# Patient Record
Sex: FEMALE | Race: ASIAN
[De-identification: names, ages, dates, MRNs, and addresses within clinical notes are randomized per-mention and may not be internally consistent; named-entity substitution may affect disease eponyms.]

---

## 2019-03-09 ENCOUNTER — HOSPITAL ENCOUNTER (INPATIENT)
Dept: HOSPITAL 19 - LDRO | Age: 34
LOS: 28 days | Discharge: HOME | End: 2019-04-06
Payer: COMMERCIAL

## 2019-03-09 VITALS — HEIGHT: 62.01 IN | WEIGHT: 125.22 LBS | BODY MASS INDEX: 22.75 KG/M2

## 2019-03-09 DIAGNOSIS — O41.1230: ICD-10-CM

## 2019-03-09 DIAGNOSIS — E03.9: ICD-10-CM

## 2019-03-09 DIAGNOSIS — Z3A.39: ICD-10-CM

## 2019-03-09 DIAGNOSIS — E28.2: ICD-10-CM

## 2019-04-03 VITALS — SYSTOLIC BLOOD PRESSURE: 96 MMHG | DIASTOLIC BLOOD PRESSURE: 60 MMHG | TEMPERATURE: 98 F | HEART RATE: 76 BPM

## 2019-04-03 VITALS — HEART RATE: 106 BPM | SYSTOLIC BLOOD PRESSURE: 112 MMHG | TEMPERATURE: 98.1 F | DIASTOLIC BLOOD PRESSURE: 75 MMHG

## 2019-04-03 LAB
BASOPHILS # BLD: 0.1 10*3/UL (ref 0–0.2)
BASOPHILS NFR BLD AUTO: 0.8 % (ref 0–2)
EOSINOPHIL # BLD: 0.2 10*3/UL (ref 0–0.7)
EOSINOPHIL NFR BLD: 2.9 % (ref 0–4)
ERYTHROCYTE [DISTWIDTH] IN BLOOD BY AUTOMATED COUNT: 13.3 % (ref 11.5–14.5)
GRANULOCYTES # BLD AUTO: 52.1 % (ref 42.2–75.2)
HCT VFR BLD AUTO: 34.5 % (ref 37–47)
HGB BLD-MCNC: 11.4 G/DL (ref 12.5–16)
LYMPHOCYTES # BLD: 2.3 10*3/UL (ref 1.2–3.4)
LYMPHOCYTES NFR BLD: 31.8 % (ref 20–51)
MCH RBC QN AUTO: 26 PG (ref 27–31)
MCHC RBC AUTO-ENTMCNC: 33 G/DL (ref 33–37)
MCV RBC AUTO: 80 FL (ref 80–100)
MONOCYTES # BLD: 0.9 10*3/UL (ref 0.1–0.6)
MONOCYTES NFR BLD AUTO: 11.7 % (ref 1.7–9.3)
NEUTROPHILS # BLD: 3.8 10*3/UL (ref 1.4–6.5)
PLATELET # BLD AUTO: 362 K/MM3 (ref 130–400)
PMV BLD AUTO: 10.3 FL (ref 7.4–10.4)
RBC # BLD AUTO: 4.34 M/MM3 (ref 4.1–5.3)

## 2019-04-03 PROCEDURE — 3E0P7VZ INTRODUCTION OF HORMONE INTO FEMALE REPRODUCTIVE, VIA NATURAL OR ARTIFICIAL OPENING: ICD-10-PCS

## 2019-04-03 NOTE — NUR
G1L0. 39-1. Ambulatory to [facility] 6 with spouse for scheduled cytotec induction.
Clean gown on. EFM and TOCO explained and applied. Pt denies contractions, LOF
or vaginal bleeding. Reports good fetal movement. Vital signs, assessment and
consents completed. Call light within reach.
1910: Reactive FHR strip. Monitors off so pt can ambulate freely.
1912: Dr.Goodpasture called and updated on status on floor. See physican
notification. Pt updated on plan of care and questions answered. Pt verbalizes
understanding.
2259: Monitors reapplied and plan of care explained.
2227: Reactive FHR strip. Cytotec 25mcg explained and administered at this
time. Plan of care explained to pt. Vistaril also administered at this time.
Pt denies needing anything else at this time.

## 2019-04-04 VITALS — TEMPERATURE: 98.4 F | SYSTOLIC BLOOD PRESSURE: 107 MMHG | DIASTOLIC BLOOD PRESSURE: 60 MMHG | HEART RATE: 109 BPM

## 2019-04-04 VITALS — SYSTOLIC BLOOD PRESSURE: 116 MMHG | DIASTOLIC BLOOD PRESSURE: 70 MMHG | TEMPERATURE: 98.3 F | HEART RATE: 93 BPM

## 2019-04-04 VITALS — DIASTOLIC BLOOD PRESSURE: 65 MMHG | HEART RATE: 86 BPM | SYSTOLIC BLOOD PRESSURE: 103 MMHG

## 2019-04-04 VITALS — TEMPERATURE: 98 F | DIASTOLIC BLOOD PRESSURE: 73 MMHG | HEART RATE: 84 BPM | SYSTOLIC BLOOD PRESSURE: 108 MMHG

## 2019-04-04 VITALS — DIASTOLIC BLOOD PRESSURE: 67 MMHG | HEART RATE: 95 BPM | SYSTOLIC BLOOD PRESSURE: 108 MMHG

## 2019-04-04 VITALS — SYSTOLIC BLOOD PRESSURE: 113 MMHG | HEART RATE: 96 BPM | DIASTOLIC BLOOD PRESSURE: 68 MMHG

## 2019-04-04 VITALS — HEART RATE: 88 BPM | DIASTOLIC BLOOD PRESSURE: 58 MMHG | SYSTOLIC BLOOD PRESSURE: 100 MMHG

## 2019-04-04 VITALS — DIASTOLIC BLOOD PRESSURE: 56 MMHG | SYSTOLIC BLOOD PRESSURE: 103 MMHG | HEART RATE: 83 BPM

## 2019-04-04 VITALS — HEART RATE: 101 BPM | SYSTOLIC BLOOD PRESSURE: 101 MMHG | DIASTOLIC BLOOD PRESSURE: 60 MMHG

## 2019-04-04 VITALS — DIASTOLIC BLOOD PRESSURE: 52 MMHG | SYSTOLIC BLOOD PRESSURE: 88 MMHG | HEART RATE: 87 BPM

## 2019-04-04 VITALS — TEMPERATURE: 97.6 F | HEART RATE: 90 BPM | DIASTOLIC BLOOD PRESSURE: 63 MMHG | SYSTOLIC BLOOD PRESSURE: 110 MMHG

## 2019-04-04 VITALS — HEART RATE: 93 BPM | SYSTOLIC BLOOD PRESSURE: 102 MMHG | DIASTOLIC BLOOD PRESSURE: 73 MMHG

## 2019-04-04 VITALS — HEART RATE: 86 BPM | DIASTOLIC BLOOD PRESSURE: 56 MMHG | SYSTOLIC BLOOD PRESSURE: 109 MMHG

## 2019-04-04 VITALS — HEART RATE: 71 BPM | DIASTOLIC BLOOD PRESSURE: 50 MMHG | SYSTOLIC BLOOD PRESSURE: 91 MMHG

## 2019-04-04 VITALS — HEART RATE: 81 BPM | SYSTOLIC BLOOD PRESSURE: 92 MMHG | DIASTOLIC BLOOD PRESSURE: 55 MMHG

## 2019-04-04 VITALS — HEART RATE: 96 BPM | DIASTOLIC BLOOD PRESSURE: 61 MMHG | SYSTOLIC BLOOD PRESSURE: 109 MMHG

## 2019-04-04 VITALS — SYSTOLIC BLOOD PRESSURE: 95 MMHG | HEART RATE: 102 BPM | DIASTOLIC BLOOD PRESSURE: 53 MMHG

## 2019-04-04 VITALS — HEART RATE: 89 BPM | DIASTOLIC BLOOD PRESSURE: 69 MMHG | SYSTOLIC BLOOD PRESSURE: 108 MMHG

## 2019-04-04 VITALS — DIASTOLIC BLOOD PRESSURE: 53 MMHG | SYSTOLIC BLOOD PRESSURE: 98 MMHG | HEART RATE: 75 BPM

## 2019-04-04 VITALS — DIASTOLIC BLOOD PRESSURE: 65 MMHG | SYSTOLIC BLOOD PRESSURE: 120 MMHG | HEART RATE: 87 BPM

## 2019-04-04 VITALS — HEART RATE: 79 BPM | DIASTOLIC BLOOD PRESSURE: 50 MMHG | SYSTOLIC BLOOD PRESSURE: 91 MMHG

## 2019-04-04 VITALS — DIASTOLIC BLOOD PRESSURE: 57 MMHG | SYSTOLIC BLOOD PRESSURE: 97 MMHG | HEART RATE: 71 BPM

## 2019-04-04 VITALS — DIASTOLIC BLOOD PRESSURE: 59 MMHG | SYSTOLIC BLOOD PRESSURE: 100 MMHG | HEART RATE: 78 BPM

## 2019-04-04 VITALS — HEART RATE: 90 BPM | DIASTOLIC BLOOD PRESSURE: 77 MMHG | SYSTOLIC BLOOD PRESSURE: 144 MMHG

## 2019-04-04 VITALS — DIASTOLIC BLOOD PRESSURE: 76 MMHG | SYSTOLIC BLOOD PRESSURE: 112 MMHG | HEART RATE: 90 BPM

## 2019-04-04 VITALS — DIASTOLIC BLOOD PRESSURE: 55 MMHG | SYSTOLIC BLOOD PRESSURE: 99 MMHG | HEART RATE: 69 BPM

## 2019-04-04 VITALS — HEART RATE: 98 BPM | DIASTOLIC BLOOD PRESSURE: 73 MMHG | SYSTOLIC BLOOD PRESSURE: 113 MMHG

## 2019-04-04 VITALS — DIASTOLIC BLOOD PRESSURE: 70 MMHG | SYSTOLIC BLOOD PRESSURE: 119 MMHG | HEART RATE: 85 BPM

## 2019-04-04 VITALS — SYSTOLIC BLOOD PRESSURE: 113 MMHG | DIASTOLIC BLOOD PRESSURE: 71 MMHG | HEART RATE: 87 BPM

## 2019-04-04 VITALS — DIASTOLIC BLOOD PRESSURE: 69 MMHG | SYSTOLIC BLOOD PRESSURE: 109 MMHG | HEART RATE: 84 BPM

## 2019-04-04 VITALS — HEART RATE: 86 BPM | DIASTOLIC BLOOD PRESSURE: 55 MMHG | SYSTOLIC BLOOD PRESSURE: 104 MMHG

## 2019-04-04 VITALS — DIASTOLIC BLOOD PRESSURE: 53 MMHG | SYSTOLIC BLOOD PRESSURE: 94 MMHG | HEART RATE: 81 BPM

## 2019-04-04 VITALS — SYSTOLIC BLOOD PRESSURE: 111 MMHG | DIASTOLIC BLOOD PRESSURE: 68 MMHG | HEART RATE: 102 BPM

## 2019-04-04 VITALS — DIASTOLIC BLOOD PRESSURE: 65 MMHG | HEART RATE: 82 BPM | SYSTOLIC BLOOD PRESSURE: 114 MMHG

## 2019-04-04 VITALS — HEART RATE: 86 BPM | DIASTOLIC BLOOD PRESSURE: 52 MMHG | SYSTOLIC BLOOD PRESSURE: 88 MMHG

## 2019-04-04 VITALS — HEART RATE: 83 BPM | SYSTOLIC BLOOD PRESSURE: 94 MMHG | DIASTOLIC BLOOD PRESSURE: 61 MMHG

## 2019-04-04 VITALS — HEART RATE: 88 BPM | SYSTOLIC BLOOD PRESSURE: 106 MMHG | DIASTOLIC BLOOD PRESSURE: 72 MMHG

## 2019-04-04 VITALS — DIASTOLIC BLOOD PRESSURE: 64 MMHG | HEART RATE: 72 BPM | SYSTOLIC BLOOD PRESSURE: 104 MMHG

## 2019-04-04 VITALS — DIASTOLIC BLOOD PRESSURE: 54 MMHG | SYSTOLIC BLOOD PRESSURE: 93 MMHG | HEART RATE: 121 BPM

## 2019-04-04 VITALS — DIASTOLIC BLOOD PRESSURE: 59 MMHG | HEART RATE: 118 BPM | SYSTOLIC BLOOD PRESSURE: 98 MMHG

## 2019-04-04 VITALS — DIASTOLIC BLOOD PRESSURE: 55 MMHG | HEART RATE: 86 BPM | SYSTOLIC BLOOD PRESSURE: 93 MMHG

## 2019-04-04 VITALS — HEART RATE: 80 BPM | SYSTOLIC BLOOD PRESSURE: 109 MMHG | DIASTOLIC BLOOD PRESSURE: 68 MMHG

## 2019-04-04 VITALS — SYSTOLIC BLOOD PRESSURE: 118 MMHG | DIASTOLIC BLOOD PRESSURE: 71 MMHG | HEART RATE: 91 BPM

## 2019-04-04 VITALS — HEART RATE: 78 BPM | SYSTOLIC BLOOD PRESSURE: 100 MMHG | DIASTOLIC BLOOD PRESSURE: 54 MMHG

## 2019-04-04 VITALS — HEART RATE: 104 BPM | SYSTOLIC BLOOD PRESSURE: 125 MMHG | DIASTOLIC BLOOD PRESSURE: 77 MMHG

## 2019-04-04 VITALS — HEART RATE: 90 BPM | SYSTOLIC BLOOD PRESSURE: 102 MMHG | DIASTOLIC BLOOD PRESSURE: 69 MMHG

## 2019-04-04 VITALS — HEART RATE: 96 BPM | DIASTOLIC BLOOD PRESSURE: 61 MMHG | SYSTOLIC BLOOD PRESSURE: 107 MMHG

## 2019-04-04 VITALS — SYSTOLIC BLOOD PRESSURE: 120 MMHG | DIASTOLIC BLOOD PRESSURE: 72 MMHG | HEART RATE: 97 BPM

## 2019-04-04 VITALS — DIASTOLIC BLOOD PRESSURE: 71 MMHG | HEART RATE: 80 BPM | SYSTOLIC BLOOD PRESSURE: 117 MMHG

## 2019-04-04 VITALS — DIASTOLIC BLOOD PRESSURE: 60 MMHG | SYSTOLIC BLOOD PRESSURE: 109 MMHG | TEMPERATURE: 97.5 F | HEART RATE: 87 BPM

## 2019-04-04 VITALS — SYSTOLIC BLOOD PRESSURE: 116 MMHG | HEART RATE: 93 BPM | DIASTOLIC BLOOD PRESSURE: 69 MMHG

## 2019-04-04 VITALS — DIASTOLIC BLOOD PRESSURE: 72 MMHG | SYSTOLIC BLOOD PRESSURE: 116 MMHG | HEART RATE: 100 BPM | TEMPERATURE: 98.4 F

## 2019-04-04 VITALS — DIASTOLIC BLOOD PRESSURE: 63 MMHG | HEART RATE: 92 BPM | SYSTOLIC BLOOD PRESSURE: 111 MMHG

## 2019-04-04 VITALS — DIASTOLIC BLOOD PRESSURE: 76 MMHG | HEART RATE: 95 BPM | SYSTOLIC BLOOD PRESSURE: 132 MMHG

## 2019-04-04 VITALS — DIASTOLIC BLOOD PRESSURE: 56 MMHG | HEART RATE: 123 BPM | SYSTOLIC BLOOD PRESSURE: 94 MMHG

## 2019-04-04 VITALS — DIASTOLIC BLOOD PRESSURE: 66 MMHG | HEART RATE: 94 BPM | SYSTOLIC BLOOD PRESSURE: 97 MMHG

## 2019-04-04 VITALS — DIASTOLIC BLOOD PRESSURE: 56 MMHG | HEART RATE: 70 BPM | SYSTOLIC BLOOD PRESSURE: 98 MMHG

## 2019-04-04 VITALS — SYSTOLIC BLOOD PRESSURE: 104 MMHG | HEART RATE: 102 BPM | DIASTOLIC BLOOD PRESSURE: 67 MMHG

## 2019-04-04 VITALS — SYSTOLIC BLOOD PRESSURE: 105 MMHG | DIASTOLIC BLOOD PRESSURE: 70 MMHG | HEART RATE: 93 BPM

## 2019-04-04 VITALS — DIASTOLIC BLOOD PRESSURE: 64 MMHG | HEART RATE: 99 BPM | SYSTOLIC BLOOD PRESSURE: 101 MMHG

## 2019-04-04 VITALS — DIASTOLIC BLOOD PRESSURE: 73 MMHG | HEART RATE: 91 BPM | SYSTOLIC BLOOD PRESSURE: 112 MMHG

## 2019-04-04 VITALS — DIASTOLIC BLOOD PRESSURE: 69 MMHG | SYSTOLIC BLOOD PRESSURE: 108 MMHG | HEART RATE: 86 BPM

## 2019-04-04 VITALS — TEMPERATURE: 98 F

## 2019-04-04 VITALS — DIASTOLIC BLOOD PRESSURE: 63 MMHG | HEART RATE: 103 BPM | SYSTOLIC BLOOD PRESSURE: 108 MMHG

## 2019-04-04 VITALS — TEMPERATURE: 97.9 F

## 2019-04-04 PROCEDURE — 3E033VJ INTRODUCTION OF OTHER HORMONE INTO PERIPHERAL VEIN, PERCUTANEOUS APPROACH: ICD-10-PCS

## 2019-04-04 NOTE — NUR
Dr.Goodpasture at nurse's station reviewing FHR strip. Agrees with this RN
with early decelerations. Orders received to increase pitocin to 32mus/hr.
Pitocin increased at this time.

## 2019-04-04 NOTE — NUR
Dr.Goodpasture at nurse's station reviewing FHR strip. See physican
notification.
2157: Pitocin turned off at this time.

## 2019-04-04 NOTE — NUR
Dr.Goodpasture at bedside and reviews FHR strip. SVE 3-4/80/-1 per provider.
Pt assisted into HF position. No new orders at this time. Continue to monitor.

## 2019-04-05 VITALS — SYSTOLIC BLOOD PRESSURE: 121 MMHG | DIASTOLIC BLOOD PRESSURE: 79 MMHG | HEART RATE: 112 BPM

## 2019-04-05 VITALS — TEMPERATURE: 99.1 F

## 2019-04-05 VITALS — SYSTOLIC BLOOD PRESSURE: 112 MMHG | DIASTOLIC BLOOD PRESSURE: 60 MMHG | HEART RATE: 97 BPM

## 2019-04-05 VITALS — HEART RATE: 136 BPM | SYSTOLIC BLOOD PRESSURE: 88 MMHG | DIASTOLIC BLOOD PRESSURE: 51 MMHG

## 2019-04-05 VITALS — HEART RATE: 126 BPM | SYSTOLIC BLOOD PRESSURE: 152 MMHG | DIASTOLIC BLOOD PRESSURE: 72 MMHG | TEMPERATURE: 102 F

## 2019-04-05 VITALS — SYSTOLIC BLOOD PRESSURE: 92 MMHG | HEART RATE: 162 BPM | DIASTOLIC BLOOD PRESSURE: 55 MMHG | TEMPERATURE: 100.2 F

## 2019-04-05 VITALS — SYSTOLIC BLOOD PRESSURE: 83 MMHG | DIASTOLIC BLOOD PRESSURE: 46 MMHG | HEART RATE: 111 BPM

## 2019-04-05 VITALS — HEART RATE: 157 BPM | DIASTOLIC BLOOD PRESSURE: 44 MMHG | SYSTOLIC BLOOD PRESSURE: 83 MMHG

## 2019-04-05 VITALS — SYSTOLIC BLOOD PRESSURE: 131 MMHG | HEART RATE: 155 BPM | DIASTOLIC BLOOD PRESSURE: 70 MMHG

## 2019-04-05 VITALS — DIASTOLIC BLOOD PRESSURE: 68 MMHG | HEART RATE: 139 BPM | SYSTOLIC BLOOD PRESSURE: 161 MMHG

## 2019-04-05 VITALS — HEART RATE: 114 BPM | TEMPERATURE: 98.1 F | SYSTOLIC BLOOD PRESSURE: 102 MMHG | DIASTOLIC BLOOD PRESSURE: 68 MMHG

## 2019-04-05 VITALS — HEART RATE: 117 BPM | SYSTOLIC BLOOD PRESSURE: 87 MMHG | DIASTOLIC BLOOD PRESSURE: 51 MMHG

## 2019-04-05 VITALS — DIASTOLIC BLOOD PRESSURE: 48 MMHG | HEART RATE: 122 BPM | SYSTOLIC BLOOD PRESSURE: 83 MMHG

## 2019-04-05 VITALS — DIASTOLIC BLOOD PRESSURE: 57 MMHG | HEART RATE: 181 BPM | SYSTOLIC BLOOD PRESSURE: 130 MMHG | TEMPERATURE: 99.7 F

## 2019-04-05 VITALS — SYSTOLIC BLOOD PRESSURE: 81 MMHG | DIASTOLIC BLOOD PRESSURE: 42 MMHG | HEART RATE: 125 BPM

## 2019-04-05 VITALS — HEART RATE: 120 BPM | SYSTOLIC BLOOD PRESSURE: 80 MMHG | DIASTOLIC BLOOD PRESSURE: 48 MMHG

## 2019-04-05 VITALS — SYSTOLIC BLOOD PRESSURE: 90 MMHG | DIASTOLIC BLOOD PRESSURE: 51 MMHG | HEART RATE: 155 BPM

## 2019-04-05 VITALS — DIASTOLIC BLOOD PRESSURE: 53 MMHG | SYSTOLIC BLOOD PRESSURE: 95 MMHG | TEMPERATURE: 98.6 F | HEART RATE: 96 BPM

## 2019-04-05 VITALS — SYSTOLIC BLOOD PRESSURE: 87 MMHG | DIASTOLIC BLOOD PRESSURE: 50 MMHG | HEART RATE: 162 BPM

## 2019-04-05 VITALS — TEMPERATURE: 97.4 F | HEART RATE: 100 BPM | SYSTOLIC BLOOD PRESSURE: 92 MMHG | DIASTOLIC BLOOD PRESSURE: 54 MMHG

## 2019-04-05 VITALS — TEMPERATURE: 99.6 F | DIASTOLIC BLOOD PRESSURE: 45 MMHG | HEART RATE: 141 BPM | SYSTOLIC BLOOD PRESSURE: 78 MMHG

## 2019-04-05 VITALS — DIASTOLIC BLOOD PRESSURE: 54 MMHG | HEART RATE: 166 BPM | SYSTOLIC BLOOD PRESSURE: 91 MMHG

## 2019-04-05 VITALS — DIASTOLIC BLOOD PRESSURE: 72 MMHG | HEART RATE: 113 BPM | SYSTOLIC BLOOD PRESSURE: 118 MMHG

## 2019-04-05 VITALS — HEART RATE: 102 BPM | SYSTOLIC BLOOD PRESSURE: 89 MMHG | DIASTOLIC BLOOD PRESSURE: 45 MMHG

## 2019-04-05 VITALS — HEART RATE: 153 BPM | SYSTOLIC BLOOD PRESSURE: 84 MMHG | DIASTOLIC BLOOD PRESSURE: 49 MMHG | TEMPERATURE: 99.4 F

## 2019-04-05 VITALS — HEART RATE: 110 BPM | SYSTOLIC BLOOD PRESSURE: 140 MMHG | DIASTOLIC BLOOD PRESSURE: 88 MMHG

## 2019-04-05 VITALS — DIASTOLIC BLOOD PRESSURE: 65 MMHG | HEART RATE: 73 BPM | SYSTOLIC BLOOD PRESSURE: 103 MMHG | TEMPERATURE: 97.3 F

## 2019-04-05 VITALS — SYSTOLIC BLOOD PRESSURE: 74 MMHG | HEART RATE: 127 BPM | DIASTOLIC BLOOD PRESSURE: 43 MMHG

## 2019-04-05 VITALS — SYSTOLIC BLOOD PRESSURE: 110 MMHG | DIASTOLIC BLOOD PRESSURE: 59 MMHG | HEART RATE: 95 BPM

## 2019-04-05 VITALS — SYSTOLIC BLOOD PRESSURE: 136 MMHG | HEART RATE: 118 BPM | DIASTOLIC BLOOD PRESSURE: 98 MMHG

## 2019-04-05 VITALS — DIASTOLIC BLOOD PRESSURE: 70 MMHG | HEART RATE: 98 BPM | SYSTOLIC BLOOD PRESSURE: 104 MMHG | TEMPERATURE: 98.2 F

## 2019-04-05 VITALS — HEART RATE: 109 BPM | SYSTOLIC BLOOD PRESSURE: 124 MMHG | TEMPERATURE: 98.9 F | DIASTOLIC BLOOD PRESSURE: 65 MMHG

## 2019-04-05 VITALS — DIASTOLIC BLOOD PRESSURE: 53 MMHG | HEART RATE: 118 BPM | TEMPERATURE: 98.5 F | SYSTOLIC BLOOD PRESSURE: 83 MMHG

## 2019-04-05 VITALS — HEART RATE: 116 BPM | SYSTOLIC BLOOD PRESSURE: 89 MMHG | DIASTOLIC BLOOD PRESSURE: 55 MMHG

## 2019-04-05 VITALS — SYSTOLIC BLOOD PRESSURE: 94 MMHG | DIASTOLIC BLOOD PRESSURE: 52 MMHG | HEART RATE: 102 BPM

## 2019-04-05 VITALS — SYSTOLIC BLOOD PRESSURE: 84 MMHG | DIASTOLIC BLOOD PRESSURE: 53 MMHG | HEART RATE: 113 BPM

## 2019-04-05 VITALS — DIASTOLIC BLOOD PRESSURE: 50 MMHG | SYSTOLIC BLOOD PRESSURE: 86 MMHG | HEART RATE: 151 BPM

## 2019-04-05 VITALS — HEART RATE: 160 BPM | SYSTOLIC BLOOD PRESSURE: 81 MMHG | DIASTOLIC BLOOD PRESSURE: 47 MMHG

## 2019-04-05 VITALS — SYSTOLIC BLOOD PRESSURE: 81 MMHG | HEART RATE: 109 BPM | DIASTOLIC BLOOD PRESSURE: 51 MMHG

## 2019-04-05 VITALS — SYSTOLIC BLOOD PRESSURE: 81 MMHG | HEART RATE: 133 BPM | DIASTOLIC BLOOD PRESSURE: 50 MMHG

## 2019-04-05 VITALS — SYSTOLIC BLOOD PRESSURE: 82 MMHG | HEART RATE: 150 BPM | DIASTOLIC BLOOD PRESSURE: 50 MMHG

## 2019-04-05 VITALS — HEART RATE: 110 BPM | SYSTOLIC BLOOD PRESSURE: 131 MMHG | DIASTOLIC BLOOD PRESSURE: 73 MMHG

## 2019-04-05 VITALS — DIASTOLIC BLOOD PRESSURE: 49 MMHG | SYSTOLIC BLOOD PRESSURE: 84 MMHG | HEART RATE: 151 BPM

## 2019-04-05 VITALS — SYSTOLIC BLOOD PRESSURE: 92 MMHG | DIASTOLIC BLOOD PRESSURE: 56 MMHG | HEART RATE: 113 BPM

## 2019-04-05 VITALS — SYSTOLIC BLOOD PRESSURE: 132 MMHG | DIASTOLIC BLOOD PRESSURE: 77 MMHG | HEART RATE: 139 BPM

## 2019-04-05 LAB
ERYTHROCYTE [DISTWIDTH] IN BLOOD BY AUTOMATED COUNT: 13.7 % (ref 11.5–14.5)
HCT VFR BLD AUTO: 22.7 % (ref 37–47)
HGB BLD-MCNC: 7.6 G/DL (ref 12.5–16)
LYMPHOCYTES NFR BLD MANUAL: 3 % (ref 20–51)
MCH RBC QN AUTO: 27 PG (ref 27–31)
MCHC RBC AUTO-ENTMCNC: 34 G/DL (ref 33–37)
MCV RBC AUTO: 80 FL (ref 80–100)
MONOCYTES NFR BLD: 7 % (ref 1.7–9.3)
NEUTS BAND NFR BLD: 33 % (ref 0–10)
NEUTS SEG NFR BLD MANUAL: 57 % (ref 42–75.2)
PLATELET # BLD AUTO: 244 K/MM3 (ref 130–400)
PLATELET BLD QL SMEAR: NORMAL
PMV BLD AUTO: 10 FL (ref 7.4–10.4)
RBC # BLD AUTO: 2.84 M/MM3 (ref 4.1–5.3)

## 2019-04-05 PROCEDURE — 0KQM0ZZ REPAIR PERINEUM MUSCLE, OPEN APPROACH: ICD-10-PCS

## 2019-04-05 NOTE — NUR
Patient assisted to bathroom with 2 assist. Patient denies feeling dizzy or
lightheaded. Ambulates to bathroom without difficulty. Nino catheter removed
and pericare provided. Patient ambulates without difficulty to room 208.

## 2019-04-05 NOTE — NUR
0745 - Patient eats breakfast and denies N/V.
0830 - Patient to bedside commode with 2 assist. While sitting patient c/o
feeling dizzy and turns pale. Patient back to bed with 2 assist. Patient
unable to void and cesar catheter was placed. Patient encouraged to rest. Call
light at bedside.

## 2019-04-05 NOTE — NUR
SVE C/+2. Plan of care explained to pt who verblaized understanding. Questions
answered.
0138: Nino removed.
0140: Pushing with instructions explained to pt. Pt begins pushing with this
RN.
0153: Dr.Goodpasture called for update on pt. See physican notification.
0216: Maternal temp 99.1 and feels hot on exam. FHR increased to 180-190bpms.
Dr.Goodpasture updated on pts status. See physican notification. Pt updated on
plan of care and explained laboring down. Pt assisted to high fowlers and
going to rest.
0235: FHR increased to 200-210bpms. Maternal temp 102.0. Dr.Goodpasture
updated and requested at hospital.
0240: Dr.Goodpasture at bedside for delivery. Pt assisted into footplates and
begins pushing with provider.
0249: Vacuum explained and applied by Dr.Goodpasture.
0249: Vacuum pop off X1 at this time. Vacuum reapplied and continues pushing
with Dr.Goodpasture.
0251: Vacuum extracted delivery of viable female infant. Infant to mothers
chest where stimulated by nursery RN. Pitocin stopped. Cord clamped X2 and
cut by FOB. Care of infant assumed by Akanksha NEUMANN.
0259: Spontaneous vaginal delivery of intact placenta. Pitocin restarted at
333mus/hr per protocol. Second degree midline perineal laceration repaired by
Dr.Goodpasture. Straight catherized at this time. Plan of care and safety
precautions explained to pt and verbalized understanding. Increased pulse
noted. Pulse ox applied. Dr.Goodpasture at bedside and aware.
0403: BP noted 91/54, Pulse 166. LR bolus infusing and pt laid flat. Fundus
firm, midline and bleeding minimal. Pt denies lightheadedness or dizziness.
0512: Pt's blood pressure's remain low. Pt continues to deny lightheaded or
dizziness. Bleeding minimal. Dr.Goodpasture updated on pts status. See
physican notification. Pt updated on new orders.
0545: Pts blood pressure noted 77/47, Pulse 133. Bleeding minimal. Pt denies
any symptoms at this time. Bedpan attempted and pt unable to void. Straight
cath completed at this time. Pericare and pads changed.
0600: Dr.Goodpasture updated on lab results. See physican notification.

## 2019-04-05 NOTE — NUR
Pt states she has pushed her epidural button multiple times and she is
continuing to have a large amount of lower abdomen pain. Reynaldo CRNA
notified.
0043: Reynaldo CRNA at bedside for epidural replacment. Pt assisted to EOB.
Difficulty tracing FHR due to maternal position. RN at bedside adjusting
monitor. Pulse ox applied and tracing.
0045: Single shot administered by ADELE Jacobs. See anesthesia records
0050: Pt assisted to wedge left position with peanut ball. Plan of care
explained to pt and  who verbalize understanding. Will continue to
monitor.

## 2019-04-05 NOTE — NUR
0700 Assessment completed. Patient denies feeling lightheaded or dizzy.
Labia swollen but soft, ice pack provided. Patient encouraged to eat and menu
provided. POC reviewed with patient and spouse.

## 2019-04-06 VITALS — DIASTOLIC BLOOD PRESSURE: 58 MMHG | HEART RATE: 120 BPM | SYSTOLIC BLOOD PRESSURE: 102 MMHG | TEMPERATURE: 97.7 F

## 2019-04-06 VITALS — DIASTOLIC BLOOD PRESSURE: 53 MMHG | HEART RATE: 104 BPM | SYSTOLIC BLOOD PRESSURE: 87 MMHG | TEMPERATURE: 97.9 F

## 2019-04-06 VITALS — SYSTOLIC BLOOD PRESSURE: 96 MMHG | TEMPERATURE: 97.7 F | DIASTOLIC BLOOD PRESSURE: 57 MMHG | HEART RATE: 105 BPM

## 2019-04-06 LAB
BASOPHILS # BLD: 0.1 10*3/UL (ref 0–0.2)
BASOPHILS NFR BLD AUTO: 0.4 % (ref 0–2)
EOSINOPHIL # BLD: 0.3 10*3/UL (ref 0–0.7)
EOSINOPHIL NFR BLD: 1.4 % (ref 0–4)
ERYTHROCYTE [DISTWIDTH] IN BLOOD BY AUTOMATED COUNT: 14.1 % (ref 11.5–14.5)
GRANULOCYTES # BLD AUTO: 79.9 % (ref 42.2–75.2)
HCT VFR BLD AUTO: 20.8 % (ref 37–47)
HGB BLD-MCNC: 6.7 G/DL (ref 12.5–16)
LYMPHOCYTES # BLD: 2.6 10*3/UL (ref 1.2–3.4)
LYMPHOCYTES NFR BLD: 10.9 % (ref 20–51)
MCH RBC QN AUTO: 26 PG (ref 27–31)
MCHC RBC AUTO-ENTMCNC: 32 G/DL (ref 33–37)
MCV RBC AUTO: 82 FL (ref 80–100)
MONOCYTES # BLD: 1.4 10*3/UL (ref 0.1–0.6)
MONOCYTES NFR BLD AUTO: 6.1 % (ref 1.7–9.3)
NEUTROPHILS # BLD: 19 10*3/UL (ref 1.4–6.5)
PLATELET # BLD AUTO: 256 K/MM3 (ref 130–400)
PMV BLD AUTO: 10.6 FL (ref 7.4–10.4)
RBC # BLD AUTO: 2.55 M/MM3 (ref 4.1–5.3)

## 2020-11-03 NOTE — NUR
Dr.Goodpasture at nurse's station reviewing FHR strip. Orders received to
decrease pitocin to 20mus/hr at this time. Pt updated on plan of care and
pitocin decreased to 20mus/hr. Pt repositioned to left lateral position with
right leg in stirrups. Call light within reach. Principal Discharge DX:	Chest pain, unspecified type